# Patient Record
Sex: FEMALE | Race: WHITE | NOT HISPANIC OR LATINO | URBAN - METROPOLITAN AREA
[De-identification: names, ages, dates, MRNs, and addresses within clinical notes are randomized per-mention and may not be internally consistent; named-entity substitution may affect disease eponyms.]

---

## 2024-06-17 ENCOUNTER — ANESTHESIA EVENT (OUTPATIENT)
Dept: GASTROENTEROLOGY | Facility: EXTERNAL LOCATION | Age: 49
End: 2024-06-17

## 2024-06-17 PROBLEM — M25.539 WRIST PAIN: Status: ACTIVE | Noted: 2024-06-17

## 2024-06-17 PROBLEM — N80.9 ENDOMETRIOSIS: Status: ACTIVE | Noted: 2024-06-17

## 2024-06-17 PROBLEM — G89.29 CHRONIC RIGHT SHOULDER PAIN: Status: ACTIVE | Noted: 2017-10-18

## 2024-06-17 PROBLEM — R09.81 SINUS CONGESTION: Status: ACTIVE | Noted: 2024-06-17

## 2024-06-17 PROBLEM — R31.9 HEMATURIA: Status: ACTIVE | Noted: 2024-06-17

## 2024-06-17 PROBLEM — G43.909 MIGRAINE HEADACHE: Status: ACTIVE | Noted: 2024-06-17

## 2024-06-17 PROBLEM — G47.00 INSOMNIA: Status: ACTIVE | Noted: 2024-06-17

## 2024-06-17 PROBLEM — F41.9 ANXIETY: Status: ACTIVE | Noted: 2024-06-17

## 2024-06-17 PROBLEM — L70.9 ACNE: Status: ACTIVE | Noted: 2024-06-17

## 2024-06-17 PROBLEM — M25.639 WRIST STIFFNESS: Status: ACTIVE | Noted: 2024-06-17

## 2024-06-17 PROBLEM — K21.9 ESOPHAGEAL REFLUX: Status: ACTIVE | Noted: 2024-06-17

## 2024-06-17 PROBLEM — F32.A DEPRESSION: Status: ACTIVE | Noted: 2024-06-17

## 2024-06-17 PROBLEM — D36.9 TUBULAR ADENOMA: Status: ACTIVE | Noted: 2024-06-17

## 2024-06-17 PROBLEM — R10.84 GENERALIZED ABDOMINAL PAIN: Status: ACTIVE | Noted: 2023-12-15

## 2024-06-17 PROBLEM — R61 NIGHT SWEATS: Status: ACTIVE | Noted: 2021-02-25

## 2024-06-17 PROBLEM — N95.1 PERIMENOPAUSAL: Status: ACTIVE | Noted: 2017-10-18

## 2024-06-17 PROBLEM — N93.8 DYSFUNCTIONAL UTERINE BLEEDING: Status: ACTIVE | Noted: 2017-10-18

## 2024-06-17 PROBLEM — R11.0 NAUSEA: Status: ACTIVE | Noted: 2023-12-15

## 2024-06-17 PROBLEM — E66.3 OVERWEIGHT: Status: ACTIVE | Noted: 2024-06-17

## 2024-06-17 PROBLEM — R19.7 DIARRHEA: Status: ACTIVE | Noted: 2023-12-15

## 2024-06-17 PROBLEM — R42 VERTIGO: Status: ACTIVE | Noted: 2022-12-20

## 2024-06-17 PROBLEM — S52.502A CLOSED FRACTURE OF LEFT DISTAL RADIUS: Status: ACTIVE | Noted: 2024-06-17

## 2024-06-17 PROBLEM — J06.9 VIRAL UPPER RESPIRATORY ILLNESS: Status: ACTIVE | Noted: 2024-06-17

## 2024-06-17 PROBLEM — F90.2 ADHD (ATTENTION DEFICIT HYPERACTIVITY DISORDER), COMBINED TYPE: Status: ACTIVE | Noted: 2024-06-17

## 2024-06-17 PROBLEM — N92.1 MENORRHAGIA WITH IRREGULAR CYCLE: Status: ACTIVE | Noted: 2024-06-17

## 2024-06-17 PROBLEM — L72.3 SEBACEOUS CYST: Status: ACTIVE | Noted: 2024-06-17

## 2024-06-17 PROBLEM — Z86.0100 HISTORY OF COLONIC POLYPS: Status: ACTIVE | Noted: 2017-10-18

## 2024-06-17 PROBLEM — J18.9 PNEUMONIA: Status: ACTIVE | Noted: 2024-06-17

## 2024-06-17 PROBLEM — J45.909 ASTHMA (HHS-HCC): Status: ACTIVE | Noted: 2024-06-17

## 2024-06-17 PROBLEM — H69.93 DYSFUNCTION OF BOTH EUSTACHIAN TUBES: Status: ACTIVE | Noted: 2022-12-20

## 2024-06-17 PROBLEM — Z86.010 HISTORY OF COLONIC POLYPS: Status: ACTIVE | Noted: 2017-10-18

## 2024-06-17 PROBLEM — D25.1 INTRAMURAL LEIOMYOMA OF UTERUS: Status: ACTIVE | Noted: 2018-03-20

## 2024-06-17 PROBLEM — M25.511 CHRONIC RIGHT SHOULDER PAIN: Status: ACTIVE | Noted: 2017-10-18

## 2024-06-17 PROBLEM — N64.4 SORENESS BREAST: Status: ACTIVE | Noted: 2024-06-17

## 2024-06-17 PROBLEM — M54.50 ACUTE MIDLINE LOW BACK PAIN WITHOUT SCIATICA: Status: ACTIVE | Noted: 2024-05-17

## 2024-06-17 PROBLEM — L73.2 HYDRADENITIS: Status: ACTIVE | Noted: 2022-04-01

## 2024-06-17 PROBLEM — R06.2 WHEEZING: Status: ACTIVE | Noted: 2024-06-17

## 2024-06-17 PROBLEM — L70.0 CYSTIC ACNE: Status: ACTIVE | Noted: 2024-06-17

## 2024-06-17 PROBLEM — N83.209 OVARIAN CYST: Status: ACTIVE | Noted: 2024-06-17

## 2024-06-17 PROBLEM — E78.5 HYPERLIPIDEMIA: Status: ACTIVE | Noted: 2021-02-25

## 2024-06-17 PROBLEM — R73.09 ELEVATED GLUCOSE: Status: ACTIVE | Noted: 2021-02-25

## 2024-06-17 PROBLEM — F17.200 CURRENT SMOKER: Status: ACTIVE | Noted: 2024-06-17

## 2024-06-17 PROBLEM — E55.9 VITAMIN D DEFICIENCY: Status: ACTIVE | Noted: 2024-06-17

## 2024-06-17 PROBLEM — A60.00 HERPES GENITALIA: Status: ACTIVE | Noted: 2024-06-17

## 2024-06-17 PROBLEM — N89.8 VAGINAL DISCHARGE: Status: ACTIVE | Noted: 2024-06-17

## 2024-06-17 PROBLEM — F17.200 NICOTINE DEPENDENCE: Status: ACTIVE | Noted: 2024-06-17

## 2024-06-17 RX ORDER — FLUTICASONE PROPIONATE AND SALMETEROL 500; 50 UG/1; UG/1
POWDER RESPIRATORY (INHALATION) 2 TIMES DAILY
COMMUNITY
Start: 2014-09-29

## 2024-06-17 RX ORDER — ALPRAZOLAM 0.25 MG/1
0.25 TABLET ORAL
COMMUNITY
Start: 2024-05-30 | End: 2024-08-28

## 2024-06-17 RX ORDER — ALBUTEROL SULFATE 90 UG/1
2 AEROSOL, METERED RESPIRATORY (INHALATION) EVERY 4 HOURS PRN
COMMUNITY
Start: 2014-02-21

## 2024-06-17 RX ORDER — FAMOTIDINE 20 MG/1
20 TABLET, FILM COATED ORAL 2 TIMES DAILY
COMMUNITY
Start: 2024-04-30

## 2024-06-17 RX ORDER — ARIPIPRAZOLE 2 MG/1
2 TABLET ORAL NIGHTLY
COMMUNITY
Start: 2024-03-22

## 2024-06-17 RX ORDER — DOXYCYCLINE 100 MG/1
100 CAPSULE ORAL 2 TIMES DAILY
COMMUNITY
Start: 2023-03-20

## 2024-06-27 ENCOUNTER — APPOINTMENT (OUTPATIENT)
Dept: GASTROENTEROLOGY | Facility: EXTERNAL LOCATION | Age: 49
End: 2024-06-27
Payer: COMMERCIAL

## 2024-06-27 ENCOUNTER — ANESTHESIA (OUTPATIENT)
Dept: GASTROENTEROLOGY | Facility: EXTERNAL LOCATION | Age: 49
End: 2024-06-27

## 2024-06-27 VITALS
HEART RATE: 68 BPM | TEMPERATURE: 96.8 F | OXYGEN SATURATION: 99 % | BODY MASS INDEX: 32.32 KG/M2 | HEIGHT: 65 IN | RESPIRATION RATE: 12 BRPM | WEIGHT: 194 LBS | DIASTOLIC BLOOD PRESSURE: 65 MMHG | SYSTOLIC BLOOD PRESSURE: 99 MMHG

## 2024-06-27 DIAGNOSIS — Z86.010 PERSONAL HISTORY OF COLONIC POLYPS: ICD-10-CM

## 2024-06-27 PROCEDURE — 45380 COLONOSCOPY AND BIOPSY: CPT | Performed by: INTERNAL MEDICINE

## 2024-06-27 PROCEDURE — 45385 COLONOSCOPY W/LESION REMOVAL: CPT | Performed by: INTERNAL MEDICINE

## 2024-06-27 PROCEDURE — 88305 TISSUE EXAM BY PATHOLOGIST: CPT | Performed by: PATHOLOGY

## 2024-06-27 PROCEDURE — 0753T DGTZ GLS MCRSCP SLD LEVEL IV: CPT | Mod: TC,SUR | Performed by: INTERNAL MEDICINE

## 2024-06-27 RX ORDER — SODIUM CHLORIDE, SODIUM LACTATE, POTASSIUM CHLORIDE, CALCIUM CHLORIDE 600; 310; 30; 20 MG/100ML; MG/100ML; MG/100ML; MG/100ML
INJECTION, SOLUTION INTRAVENOUS CONTINUOUS PRN
Status: DISCONTINUED | OUTPATIENT
Start: 2024-06-27 | End: 2024-06-27

## 2024-06-27 RX ORDER — PROPOFOL 10 MG/ML
INJECTION, EMULSION INTRAVENOUS AS NEEDED
Status: DISCONTINUED | OUTPATIENT
Start: 2024-06-27 | End: 2024-06-27

## 2024-06-27 RX ORDER — ONDANSETRON HYDROCHLORIDE 2 MG/ML
4 INJECTION, SOLUTION INTRAVENOUS ONCE AS NEEDED
Status: DISCONTINUED | OUTPATIENT
Start: 2024-06-27 | End: 2024-06-28 | Stop reason: HOSPADM

## 2024-06-27 RX ORDER — SODIUM CHLORIDE 9 MG/ML
20 INJECTION, SOLUTION INTRAVENOUS CONTINUOUS
Status: DISCONTINUED | OUTPATIENT
Start: 2024-06-27 | End: 2024-06-28 | Stop reason: HOSPADM

## 2024-06-27 SDOH — HEALTH STABILITY: MENTAL HEALTH: CURRENT SMOKER: 1

## 2024-06-27 ASSESSMENT — PAIN - FUNCTIONAL ASSESSMENT
PAIN_FUNCTIONAL_ASSESSMENT: 0-10

## 2024-06-27 ASSESSMENT — PAIN SCALES - GENERAL
PAINLEVEL_OUTOF10: 0 - NO PAIN
PAIN_LEVEL: 0
PAINLEVEL_OUTOF10: 0 - NO PAIN

## 2024-06-27 ASSESSMENT — COLUMBIA-SUICIDE SEVERITY RATING SCALE - C-SSRS
2. HAVE YOU ACTUALLY HAD ANY THOUGHTS OF KILLING YOURSELF?: NO
1. IN THE PAST MONTH, HAVE YOU WISHED YOU WERE DEAD OR WISHED YOU COULD GO TO SLEEP AND NOT WAKE UP?: NO
6. HAVE YOU EVER DONE ANYTHING, STARTED TO DO ANYTHING, OR PREPARED TO DO ANYTHING TO END YOUR LIFE?: NO

## 2024-06-27 NOTE — DISCHARGE INSTRUCTIONS
Patient Instructions Post Endoscopy Procedure      The anesthetics, sedatives or narcotics which were given to you today will be acting in your body for the next 24 hours, so you might feel a little sleepy or groggy.  This feeling should slowly wear off. Carefully read and follow the instructions.     You received sedation today:  - Do not drive or operate any machinery or power tools of any kind.   - No alcoholic beverages today, not even beer or wine.  - Do not make any important decisions or sign any legal documents.  - No over the counter medications that contain alcohol or that may cause drowsiness.    While it is common to experience mild to moderate abdominal distention, gas, or belching after your procedure, if any of these symptoms occur following discharge from the GI Lab or within one week of having your procedure, call the [facility] [place] to be advised whether a visit to your nearest Urgent Care or Emergency Department is indicated.  Take this paper with you if you go.   - If you develop an allergic reaction to the medications that were given during your procedure such as difficulty breathing, rash, hives, severe nausea, vomiting or lightheadedness.  - If you experience chest pain, shortness of breath, severe abdominal pain, fevers and chills.  -If you develop signs and symptoms of bleeding such as blood in your spit, if your stools turn black, tarry, or bloody  - If you have not urinated within 8 hours following your procedure.  - If your IV site becomes painful, red, inflamed, or looks infected.    If you received a biopsy/polypectomy the following instructions apply below:  _x_ Do not use non-steroidal medications or anti-coagulants for one week following your procedure. (Examples of these types of medications are: Advil, Arthrotec, Aleve, Coumadin, Ecotrin, Heparin, Ibuprofen, Indocin, Motrin, Naprosyn, Nuprin, Plavix, Vioxx, and Voltarin, or their generic forms.  This list is not  all-inclusive.  Check with your physician or pharmacist before resuming medications.)   _x_ Eat a soft diet today.  Avoid foods that are poorly digested for the next 24 hours.  These foods would include: nuts, beans, lettuce, red meats, and fried foods. Start with liquids and advance your diet as tolerated, gradually work up to eating solids.   __ You can restart your ASA tomorrow  __ You can resume your anticoagulation therapy -     Your physician recommends the additional following instructions:    -You have a contact number available for emergencies. The signs and symptoms of potential delayed complications were discussed with you. You may return to normal activities tomorrow.  -Resume your previous diet or other if specified.  -Continue your present medications.   -We are waiting for your pathology results, if applicable. The results will be available in [facility]. I will send you a message with any recommendations.  -The findings and recommendations have been discussed with you and/or family.  -Please see Medication Reconciliation Form for new medication/medications prescribed.     If you experience any problems or have any questions following discharge from the GI Lab, please call: [phone] from 7 am- 4:30 pm.  In the event of an emergency please go to the closest Emergency Department or call Dr. Hernandez at [phone]

## 2024-06-27 NOTE — ANESTHESIA PREPROCEDURE EVALUATION
Patient: Vidhya Erazo    Procedure Information       Date/Time: 06/27/24 0940    Scheduled providers: Lalita SILVA MD    Procedure: COLONOSCOPY    Location: [facility]            Relevant Problems   Cardiac   (+) Hyperlipidemia   (+) Soreness breast      Pulmonary   (+) Asthma (HHS-HCC)   (+) Pneumonia      Neuro   (+) Anxiety   (+) Depression      GI   (+) Esophageal reflux      HEENT   (+) Sinus congestion      ID   (+) Herpes genitalia   (+) Viral upper respiratory illness      GYN   (+) Dysfunctional uterine bleeding   (+) Endometriosis   (+) Intramural leiomyoma of uterus   (+) Menorrhagia with irregular cycle       Clinical information reviewed:   Tobacco  Allergies  Meds   Med Hx  Surg Hx  OB Status  Fam Hx  Soc   Hx      Vitals:    06/27/24 0932   BP: 104/67   Pulse: 76   Resp: 16   Temp: 36.7 °C (98.1 °F)   SpO2: 99%       Past Surgical History:   Procedure Laterality Date    COLONOSCOPY  04/28/2014    Complete Colonoscopy    OTHER SURGICAL HISTORY  04/28/2014    Ovarian Cystectomy     Past Medical History:   Diagnosis Date    Anxiety     Asthma (HHS-HCC)     Depression     GERD (gastroesophageal reflux disease)     Pain in left shoulder 01/30/2017    Left shoulder pain    Personal history of diseases of the skin and subcutaneous tissue 09/12/2013    History of acne    Personal history of other diseases of the female genital tract 01/30/2017    History of amenorrhea    Personal history of other diseases of the female genital tract 12/28/2015    History of vaginal discharge    Personal history of other diseases of the musculoskeletal system and connective tissue 01/30/2017    History of low back pain    Personal history of other diseases of the respiratory system 01/30/2017    History of acute sinusitis    Personal history of urinary calculi 01/30/2017    History of kidney stones    Unspecified acute conjunctivitis, bilateral 01/30/2017    Acute bacterial conjunctivitis of  both eyes       Current Outpatient Medications:     albuterol (ProAir HFA) 90 mcg/actuation inhaler, Inhale 2 puffs every 4 hours if needed., Disp: , Rfl:     ALPRAZolam (Xanax) 0.25 mg tablet, Take 1 tablet (0.25 mg) by mouth., Disp: , Rfl:     ARIPiprazole (Abilify) 2 mg tablet, Take 2 tablets (4 mg) by mouth once daily at bedtime., Disp: , Rfl:     doxycycline (Vibramycin) 100 mg capsule, Take 1 capsule (100 mg) by mouth twice a day., Disp: , Rfl:     famotidine (Pepcid) 20 mg tablet, Take 1 tablet (20 mg) by mouth twice a day., Disp: , Rfl:     fluticasone propion-salmeteroL (Advair Diskus) 500-50 mcg/dose diskus inhaler, Inhale twice a day., Disp: , Rfl:     Current Facility-Administered Medications:     ondansetron (Zofran) injection 4 mg, 4 mg, intravenous, Once PRN, Lalita SILVA MD    sodium chloride 0.9% infusion, 20 mL/hr, intravenous, Continuous, Lalita SILVA MD  Prior to Admission medications    Medication Sig Start Date End Date Taking? Authorizing Provider   albuterol (ProAir HFA) 90 mcg/actuation inhaler Inhale 2 puffs every 4 hours if needed. 2/21/14  Yes Historical Provider, MD   ALPRAZolam (Xanax) 0.25 mg tablet Take 1 tablet (0.25 mg) by mouth. 5/30/24 8/28/24 Yes Historical Provider, MD   ARIPiprazole (Abilify) 2 mg tablet Take 2 tablets (4 mg) by mouth once daily at bedtime. 3/22/24  Yes Historical Provider, MD   doxycycline (Vibramycin) 100 mg capsule Take 1 capsule (100 mg) by mouth twice a day. 3/20/23  Yes Historical Provider, MD   famotidine (Pepcid) 20 mg tablet Take 1 tablet (20 mg) by mouth twice a day. 4/30/24  Yes Historical Provider, MD   fluticasone propion-salmeteroL (Advair Diskus) 500-50 mcg/dose diskus inhaler Inhale twice a day. 9/29/14  Yes Historical Provider, MD     Allergies   Allergen Reactions    Sulfa (Sulfonamide Antibiotics) Other     Yeast infections     Social History     Tobacco Use    Smoking status: Every Day     Current packs/day: 0.50     Average packs/day:  "0.5 packs/day for 30.0 years (15.0 ttl pk-yrs)     Types: Cigarettes    Smokeless tobacco: Never   Substance Use Topics    Alcohol use: Yes     Comment: raely         Chemistry    No results found for: \"NA\", \"K\", \"CL\", \"CO2\", \"BUN\", \"CREATININE\", \"GLU\" No results found for: \"CALCIUM\", \"ALKPHOS\", \"AST\", \"ALT\", \"BILITOT\"       No results found for: \"WBC\", \"HGB\", \"HCT\", \"PLT\"  No results found for: \"PROTIME\", \"PTT\", \"INR\"  No results found for this or any previous visit (from the past 4464 hour(s)).    NPO Detail:  NPO/Void Status  Date of Last Liquid: 06/27/24  Time of Last Liquid: 0430  Date of Last Solid: 06/25/24  Last Intake Type: Clear fluids         Physical Exam    Airway  Mallampati: III  TM distance: >3 FB  Neck ROM: full     Cardiovascular - normal exam     Dental    Pulmonary - normal exam     Abdominal - normal exam             Anesthesia Plan    History of general anesthesia?: yes  History of complications of general anesthesia?: no    ASA 2     MAC     The patient is a current smoker.  Patient was previously instructed to abstain from smoking on day of procedure.  Patient smoked on day of procedure.    intravenous induction   Anesthetic plan and risks discussed with patient.    Plan discussed with attending.      "

## 2024-06-27 NOTE — H&P
Outpatient Hospital Procedure    Patient Profile-Procedures  Initial Info  Patient Demographics  Name Vidhya Erazo  Date of Birth 1975  MRN [id number]  Address   [address] [id number] [address]  [facility] [address]    Primary Phone Number [phone]  Secondary Phone Number    PCP Michelle Andrea    Procedures   Colonoscopy      Indication:  surveillance    Primary contact name and number   Extended Emergency Contact Information  Primary Emergency Contact: Carla Erazo  Home Phone: [phone]  Relation: Other    General Health  Weight   Vitals:    06/27/24 0932   Weight: 88 kg (194 lb)     BMI Body mass index is 32.28 kg/m².    Allergies  Allergies   Allergen Reactions    Sulfa (Sulfonamide Antibiotics) Other     Yeast infections       Past Medical History   Past Medical History:   Diagnosis Date    Anxiety     Asthma ([facility])     Depression     GERD (gastroesophageal reflux disease)     Pain in left shoulder 01/30/2017    Left shoulder pain    Personal history of diseases of the skin and subcutaneous tissue 09/12/2013    History of acne    Personal history of other diseases of the female genital tract 01/30/2017    History of amenorrhea    Personal history of other diseases of the female genital tract 12/28/2015    History of vaginal discharge    Personal history of other diseases of the musculoskeletal system and connective tissue 01/30/2017    History of low back pain    Personal history of other diseases of the respiratory system 01/30/2017    History of acute sinusitis    Personal history of urinary calculi 01/30/2017    History of kidney stones    Unspecified acute conjunctivitis, bilateral 01/30/2017    Acute bacterial conjunctivitis of both eyes       Provider assessment  Diagnosis  Medication Reviewed - yes  Prior to Admission medications    Medication Sig Start Date End Date Taking? Authorizing Provider   albuterol (ProAir HFA) 90 mcg/actuation  inhaler Inhale 2 puffs every 4 hours if needed. 2/21/14  Yes Historical Provider, MD   ALPRAZolam (Xanax) 0.25 mg tablet Take 1 tablet (0.25 mg) by mouth. 5/30/24 8/28/24 Yes Historical Provider, MD   ARIPiprazole (Abilify) 2 mg tablet Take 2 tablets (4 mg) by mouth once daily at bedtime. 3/22/24  Yes Historical Provider, MD   doxycycline (Vibramycin) 100 mg capsule Take 1 capsule (100 mg) by mouth twice a day. 3/20/23  Yes Historical Provider, MD   famotidine (Pepcid) 20 mg tablet Take 1 tablet (20 mg) by mouth twice a day. 4/30/24  Yes Historical Provider, MD   fluticasone propion-salmeteroL (Advair Diskus) 500-50 mcg/dose diskus inhaler Inhale twice a day. 9/29/14  Yes Historical Provider, MD       This is my H&P    Physical Exam  Physical Exam  Constitutional:       Comments: Awake   HENT:      Head: Normocephalic.   Cardiovascular:      Rate and Rhythm: Normal rate and regular rhythm.   Pulmonary:      Effort: Pulmonary effort is normal.      Breath sounds: Normal breath sounds.   Abdominal:      General: Bowel sounds are normal.      Palpations: Abdomen is soft.   Neurological:      Mental Status: She is alert.   Psychiatric:         Mood and Affect: Mood normal.           Oropharyngeal Classification II (hard and soft palate, upper portion of tonsils and uvula visible)  ASA PS Classification 2  Sedation Plan Deep  Procedure Plan - pre-procedural (re)assesment completed by physician:  discharge/transfer patient when discharge criteria met    Lalita Hernandez MD  6/27/2024 9:57 AM

## 2024-06-27 NOTE — ANESTHESIA POSTPROCEDURE EVALUATION
Patient: Vidhya Erazo    Procedure Summary       Date: 06/27/24 Room / Location: [facility]    Anesthesia Start: 0952 Anesthesia Stop: 1018    Procedure: COLONOSCOPY Diagnosis: Personal history of colonic polyps    Scheduled Providers: Lalita SILVA MD Responsible Provider: SABINA Paige    Anesthesia Type: MAC ASA Status: 2            Anesthesia Type: MAC    Vitals Value Taken Time   /63 06/27/24 1028   Temp 36 °C (96.8 °F) 06/27/24 1018   Pulse 69 06/27/24 1028   Resp 15 06/27/24 1028   SpO2 100 % 06/27/24 1028       Anesthesia Post Evaluation    Patient location during evaluation: bedside  Patient participation: complete - patient participated  Level of consciousness: awake and alert  Pain score: 0  Pain management: adequate  Airway patency: patent  Cardiovascular status: acceptable  Respiratory status: acceptable  Hydration status: acceptable  Postoperative Nausea and Vomiting: none        No notable events documented.

## 2024-07-09 LAB
LABORATORY COMMENT REPORT: NORMAL
PATH REPORT.FINAL DX SPEC: NORMAL
PATH REPORT.GROSS SPEC: NORMAL
PATH REPORT.TOTAL CANCER: NORMAL

## 2024-07-21 ENCOUNTER — OFFICE VISIT (OUTPATIENT)
Dept: URGENT CARE | Facility: CLINIC | Age: 49
End: 2024-07-21
Payer: COMMERCIAL

## 2024-07-21 VITALS
RESPIRATION RATE: 18 BRPM | SYSTOLIC BLOOD PRESSURE: 113 MMHG | TEMPERATURE: 97.8 F | BODY MASS INDEX: 32.78 KG/M2 | OXYGEN SATURATION: 96 % | HEART RATE: 88 BPM | WEIGHT: 197 LBS | DIASTOLIC BLOOD PRESSURE: 75 MMHG

## 2024-07-21 DIAGNOSIS — S61.211A LACERATION OF LEFT INDEX FINGER WITHOUT FOREIGN BODY WITHOUT DAMAGE TO NAIL, INITIAL ENCOUNTER: Primary | ICD-10-CM

## 2024-07-21 PROCEDURE — 99203 OFFICE O/P NEW LOW 30 MIN: CPT | Performed by: PHYSICIAN ASSISTANT

## 2024-07-21 PROCEDURE — 12001 RPR S/N/AX/GEN/TRNK 2.5CM/<: CPT | Performed by: PHYSICIAN ASSISTANT

## 2024-07-21 PROCEDURE — 90715 TDAP VACCINE 7 YRS/> IM: CPT | Performed by: PHYSICIAN ASSISTANT

## 2024-07-21 PROCEDURE — 90471 IMMUNIZATION ADMIN: CPT | Performed by: PHYSICIAN ASSISTANT

## 2024-07-21 ASSESSMENT — PAIN SCALES - GENERAL: PAINLEVEL: 5

## 2024-07-21 NOTE — PATIENT INSTRUCTIONS
Assessment/Plan   Problem List Items Addressed This Visit       Laceration of left index finger without foreign body without damage to nail - Primary      2 separate lacerations to the distal index finger pad without nail involvement.  The smaller wound was closed with 1 simple interrupted suture at the larger wound closed with 4 simple interrupted sutures.  Recommending return in 7 to 10 days to have the sutures removed.  The patient was dressed with antibiotic ointment nonadherent dressing and provided a cage type splint for protection of the distal left index finger.  On exam there is no tendon or joint space involvement there is no evidence of any retained foreign bodies.  Wounds closed and close approximation.  Return promptly if there is any signs of infection such as redness swelling increasing pain or any discharge from the site.  Tdap administered today

## 2024-07-21 NOTE — PROGRESS NOTES
Subjective   Patient ID: Vidhya Erazo is a 48 y.o. female who presents for Laceration (Pt c/o laceration to left index finger on headge kym 1 hr ago).  Patient sustained laceration to the distal left index finger 1 hour prior to arrival.  Last tetanus immunization 2018.  She denies any further injuries, illness or complaints    Past Medical History:   Diagnosis Date    Anxiety     Asthma (HHS-HCC)     Depression     GERD (gastroesophageal reflux disease)     Pain in left shoulder 01/30/2017    Left shoulder pain    Personal history of diseases of the skin and subcutaneous tissue 09/12/2013    History of acne    Personal history of other diseases of the female genital tract 01/30/2017    History of amenorrhea    Personal history of other diseases of the female genital tract 12/28/2015    History of vaginal discharge    Personal history of other diseases of the musculoskeletal system and connective tissue 01/30/2017    History of low back pain    Personal history of other diseases of the respiratory system 01/30/2017    History of acute sinusitis    Personal history of urinary calculi 01/30/2017    History of kidney stones    Unspecified acute conjunctivitis, bilateral 01/30/2017    Acute bacterial conjunctivitis of both eyes         The remainder of the systems were reviewed and are negative unless noted above      Objective   /75   Pulse 88   Temp 36.6 °C (97.8 °F)   Resp 18   Wt 89.4 kg (197 lb)   LMP 06/27/2024 Comment: declined preg test  SpO2 96%   BMI 32.78 kg/m²   Physical Exam  Constitutional:       General: She is not in acute distress.     Appearance: Normal appearance. She is not ill-appearing, toxic-appearing or diaphoretic.   HENT:      Head: Normocephalic and atraumatic.      Mouth/Throat:      Mouth: Mucous membranes are moist.      Pharynx: Oropharynx is clear.   Eyes:      Conjunctiva/sclera: Conjunctivae normal.   Cardiovascular:      Rate and Rhythm: Normal rate and regular  rhythm.      Heart sounds: No murmur heard.  Pulmonary:      Effort: Pulmonary effort is normal. No respiratory distress.      Breath sounds: Normal breath sounds. No wheezing.   Musculoskeletal:         General: No swelling, tenderness, deformity or signs of injury. Normal range of motion.      Cervical back: Normal range of motion and neck supple.   Skin:     General: Skin is warm and dry.      Findings: No erythema or rash.      Comments: Complex laceration to the distal left index finger pad.  Does not involve the nail.  2 separate lacerations, irregular and jagged in shape without extension into tendon or joint space   Neurological:      General: No focal deficit present.      Mental Status: She is alert and oriented to person, place, and time.      Gait: Gait normal.         Patient ID: Vidhya Erazo is a 48 y.o. female.    Laceration Repair    Date/Time: 7/21/2024 1:22 PM    Performed by: Chemo Rose PA-C  Authorized by: Chemo Rose PA-C    Consent:     Consent obtained:  Verbal    Consent given by:  Patient    Risks, benefits, and alternatives were discussed: yes      Risks discussed:  Pain, poor cosmetic result, infection and poor wound healing    Alternatives discussed:  No treatment  Universal protocol:     Patient identity confirmed:  Verbally with patient  Anesthesia:     Anesthesia method: Digital block.  Laceration details:     Location:  Finger    Finger location:  L index finger    Length (cm):  2    Depth (mm):  3  Pre-procedure details:     Preparation:  Patient was prepped and draped in usual sterile fashion  Exploration:     Hemostasis achieved with:  Direct pressure    Wound exploration: wound explored through full range of motion and entire depth of wound visualized      Contaminated: no    Treatment:     Area cleansed with:  Povidone-iodine and saline    Amount of cleaning:  Standard    Irrigation solution:  Sterile saline    Irrigation method:  Syringe    Foreign body removal:  no FB.      Debridement:  None  Skin repair:     Repair method:  Sutures    Suture size:  4-0    Suture material:  Nylon    Suture technique:  Simple interrupted  Approximation:     Approximation:  Close  Repair type:     Repair type:  Simple  Post-procedure details:     Dressing:  Antibiotic ointment, non-adherent dressing and splint for protection    Procedure completion:  Tolerated      Assessment/Plan   Problem List Items Addressed This Visit       Laceration of left index finger without foreign body without damage to nail - Primary      2 separate lacerations to the distal index finger pad without nail involvement.  The smaller wound was closed with 1 simple interrupted suture at the larger wound closed with 4 simple interrupted sutures.  Recommending return in 7 to 10 days to have the sutures removed.  The patient was dressed with antibiotic ointment nonadherent dressing and provided a cage type splint for protection of the distal left index finger.  On exam there is no tendon or joint space involvement there is no evidence of any retained foreign bodies.  Wounds closed and close approximation.  Return promptly if there is any signs of infection such as redness swelling increasing pain or any discharge from the site.  Tdap administered today